# Patient Record
Sex: MALE | Race: OTHER | NOT HISPANIC OR LATINO | URBAN - METROPOLITAN AREA
[De-identification: names, ages, dates, MRNs, and addresses within clinical notes are randomized per-mention and may not be internally consistent; named-entity substitution may affect disease eponyms.]

---

## 2021-05-16 ENCOUNTER — EMERGENCY (EMERGENCY)
Facility: HOSPITAL | Age: 41
LOS: 1 days | Discharge: HOME | End: 2021-05-16
Attending: EMERGENCY MEDICINE | Admitting: EMERGENCY MEDICINE
Payer: MEDICAID

## 2021-05-16 VITALS
OXYGEN SATURATION: 97 % | DIASTOLIC BLOOD PRESSURE: 79 MMHG | TEMPERATURE: 98 F | RESPIRATION RATE: 16 BRPM | SYSTOLIC BLOOD PRESSURE: 142 MMHG | HEART RATE: 89 BPM

## 2021-05-16 LAB
ALBUMIN SERPL ELPH-MCNC: 4.5 G/DL — SIGNIFICANT CHANGE UP (ref 3.3–5)
ALP SERPL-CCNC: 56 U/L — SIGNIFICANT CHANGE UP (ref 40–120)
ALT FLD-CCNC: 17 U/L — SIGNIFICANT CHANGE UP (ref 4–41)
ANION GAP SERPL CALC-SCNC: 8 MMOL/L — SIGNIFICANT CHANGE UP (ref 7–14)
AST SERPL-CCNC: 16 U/L — SIGNIFICANT CHANGE UP (ref 4–40)
BASOPHILS # BLD AUTO: 0.02 K/UL — SIGNIFICANT CHANGE UP (ref 0–0.2)
BASOPHILS NFR BLD AUTO: 0.3 % — SIGNIFICANT CHANGE UP (ref 0–2)
BILIRUB SERPL-MCNC: 0.4 MG/DL — SIGNIFICANT CHANGE UP (ref 0.2–1.2)
BUN SERPL-MCNC: 15 MG/DL — SIGNIFICANT CHANGE UP (ref 7–23)
CALCIUM SERPL-MCNC: 9.6 MG/DL — SIGNIFICANT CHANGE UP (ref 8.4–10.5)
CHLORIDE SERPL-SCNC: 104 MMOL/L — SIGNIFICANT CHANGE UP (ref 98–107)
CO2 SERPL-SCNC: 25 MMOL/L — SIGNIFICANT CHANGE UP (ref 22–31)
CREAT SERPL-MCNC: 0.95 MG/DL — SIGNIFICANT CHANGE UP (ref 0.5–1.3)
EOSINOPHIL # BLD AUTO: 0.07 K/UL — SIGNIFICANT CHANGE UP (ref 0–0.5)
EOSINOPHIL NFR BLD AUTO: 1 % — SIGNIFICANT CHANGE UP (ref 0–6)
GLUCOSE SERPL-MCNC: 83 MG/DL — SIGNIFICANT CHANGE UP (ref 70–99)
HCT VFR BLD CALC: 41.4 % — SIGNIFICANT CHANGE UP (ref 39–50)
HGB BLD-MCNC: 13.8 G/DL — SIGNIFICANT CHANGE UP (ref 13–17)
IANC: 3.57 K/UL — SIGNIFICANT CHANGE UP (ref 1.5–8.5)
IMM GRANULOCYTES NFR BLD AUTO: 0.4 % — SIGNIFICANT CHANGE UP (ref 0–1.5)
INR BLD: 1.65 RATIO — HIGH (ref 0.88–1.16)
LYMPHOCYTES # BLD AUTO: 2.37 K/UL — SIGNIFICANT CHANGE UP (ref 1–3.3)
LYMPHOCYTES # BLD AUTO: 34.3 % — SIGNIFICANT CHANGE UP (ref 13–44)
MCHC RBC-ENTMCNC: 28.6 PG — SIGNIFICANT CHANGE UP (ref 27–34)
MCHC RBC-ENTMCNC: 33.3 GM/DL — SIGNIFICANT CHANGE UP (ref 32–36)
MCV RBC AUTO: 85.9 FL — SIGNIFICANT CHANGE UP (ref 80–100)
MONOCYTES # BLD AUTO: 0.85 K/UL — SIGNIFICANT CHANGE UP (ref 0–0.9)
MONOCYTES NFR BLD AUTO: 12.3 % — SIGNIFICANT CHANGE UP (ref 2–14)
NEUTROPHILS # BLD AUTO: 3.57 K/UL — SIGNIFICANT CHANGE UP (ref 1.8–7.4)
NEUTROPHILS NFR BLD AUTO: 51.7 % — SIGNIFICANT CHANGE UP (ref 43–77)
NRBC # BLD: 0 /100 WBCS — SIGNIFICANT CHANGE UP
NRBC # FLD: 0 K/UL — SIGNIFICANT CHANGE UP
PLATELET # BLD AUTO: 265 K/UL — SIGNIFICANT CHANGE UP (ref 150–400)
POTASSIUM SERPL-MCNC: 4.1 MMOL/L — SIGNIFICANT CHANGE UP (ref 3.5–5.3)
POTASSIUM SERPL-SCNC: 4.1 MMOL/L — SIGNIFICANT CHANGE UP (ref 3.5–5.3)
PROT SERPL-MCNC: 7.3 G/DL — SIGNIFICANT CHANGE UP (ref 6–8.3)
PROTHROM AB SERPL-ACNC: 18.5 SEC — HIGH (ref 10.6–13.6)
RBC # BLD: 4.82 M/UL — SIGNIFICANT CHANGE UP (ref 4.2–5.8)
RBC # FLD: 13.2 % — SIGNIFICANT CHANGE UP (ref 10.3–14.5)
SODIUM SERPL-SCNC: 137 MMOL/L — SIGNIFICANT CHANGE UP (ref 135–145)
WBC # BLD: 6.91 K/UL — SIGNIFICANT CHANGE UP (ref 3.8–10.5)
WBC # FLD AUTO: 6.91 K/UL — SIGNIFICANT CHANGE UP (ref 3.8–10.5)

## 2021-05-16 PROCEDURE — 93971 EXTREMITY STUDY: CPT | Mod: 26,LT

## 2021-05-16 PROCEDURE — 99285 EMERGENCY DEPT VISIT HI MDM: CPT

## 2021-05-16 NOTE — ED PROVIDER NOTE - CLINICAL SUMMARY MEDICAL DECISION MAKING FREE TEXT BOX
39 y/o male with no significant PMHx presents to the ER c/o 1 week of left leg pain/swelling.  Pt was seen at [facility] 2 days ago for the same complaints and was started on a course of Eliquis.  Pt states he had an elevated d-dimer at the hospital and states they did not have ultrasound to check his leg so they started him on Eliquis.  Pt is well appearing, NAD, will check labs, US.

## 2021-05-16 NOTE — ED PROVIDER NOTE - PHYSICAL EXAMINATION
LLE: NV intact, +TTP at medial aspect of leg with area of linear redness extending up the leg medially.  Compartments are soft. LLE: NV intact, +TTP at medial aspect of leg with area of linear redness extending up the leg medially.  Compartments are soft.    Attending/Suzan: Well-appearing, NAD; PERRL/EOMI, non-icterus, supple, no RERE, no JVD, RRR, CTAB; Abd-soft, NT/ND, no HSM; no LE +LLE swelling +cos, with overlying erythema, A&Ox3, nonfocal; Skin-warm/dry

## 2021-05-16 NOTE — ED PROVIDER NOTE - NSFOLLOWUPINSTRUCTIONS_ED_ALL_ED_FT
Follow up with your Primary Medical Doctor in 1-2 days.  Repeat Ultrasound in 2-3 days.  Stop taking Eliquis.  Warm compresses.  Elevate leg.  Return to the ER for any persistent/worsening or new symptoms chest pain, shortness of breath, swelling, increased pain or any concerning symptoms. Follow up with your Primary Medical Doctor in 1-2 days.  Repeat Ultrasound in 2-3 days.  Stop taking Eliquis.  Warm compresses.  Elevate leg.  Take Ibuprofen 600mg orally every 8 hours as needed for pain take with food.  Return to the ER for any persistent/worsening or new symptoms chest pain, shortness of breath, swelling, increased pain or any concerning symptoms.

## 2021-05-16 NOTE — ED ADULT TRIAGE NOTE - CHIEF COMPLAINT QUOTE
C/C left leg swelling, redness pain 2xweeks. Pt appears uncomfortable, difficulty ambulating. Denies trauma. Pt seen [facility] Friday Dx elevated d-dimer. No doppler available. Pt prescribed 10mg Eliquis. Denies overt bleeding, SOB, chest pain or dizziness. History HTN. Hyperlipidemia

## 2021-05-16 NOTE — ED PROVIDER NOTE - PATIENT PORTAL LINK FT
You can access the FollowMyHealth Patient Portal offered by [facility] by registering at the following website: [email or web address]. By joining [employer]’s FollowMyHealth portal, you will also be able to view your health information using other applications (apps) compatible with our system.

## 2021-05-16 NOTE — ED ADULT NURSE NOTE - CHIEF COMPLAINT QUOTE
C/C left leg swelling, redness pain 2xweeks. Pt appears uncomfortable, difficulty ambulating. Denies trauma. Pt seen [facility] Friday Dx elevated d-dimer. No doppler available. Pt prescribed 10mg Eliquis. Denies overt bleeding, SOB, chest pain or dizziness. History HTN. Hyperlipidemia

## 2021-05-16 NOTE — ED ADULT NURSE NOTE - OBJECTIVE STATEMENT
Patient alert and awake, oriented x4, breathing with ease on room, skin intact, ambulates with steady gait. Patient with redness and swelling noted to left lower leg x 1 week, complains of difficulty walking. Patient denies fevers, chills, chest pain, or trouble breathing.

## 2021-05-16 NOTE — ED PROVIDER NOTE - PROGRESS NOTE DETAILS
RICKIE Ca: US negative for DVT, us showing superficial thrombophlebitis.  Will advise to stop Eliquis, warm compresses, pain control, follow up PMD.  Pt given strict return precautions.

## 2021-05-16 NOTE — ED PROVIDER NOTE - OBJECTIVE STATEMENT
39 y/o male with no significant PMHx presents to the ER c/o 1 week of left leg pain/swelling.  Pt was seen at [facility] 2 days ago for the same complaints and was started on a course of Eliquis.  Pt states he had an elevated d-dimer at the hospital and states they did not have ultrasound to check his leg so they started him on Eliquis.  Pt reports continued pain and swelling.  Pt denies chest pain, shortness of breath fevers, chills, trauma, recent travel, hx of clots. 41 y/o male with no significant PMHx presents to the ER c/o 1 week of left leg pain/swelling.  Pt was seen at [facility] 2 days ago for the same complaints and was started on a course of Eliquis.  Pt states he had an elevated d-dimer at the hospital and states they did not have ultrasound to check his leg so they started him on Eliquis.  Pt reports continued pain and swelling.  Pt denies chest pain, shortness of breath fevers, chills, trauma, recent travel, hx of clots.    Attending/Suzan: 39 yo m as described above, p/w ~one week of LLE swelling. Seen and eval at [facility] ~2 days ago, told he an elevated d-dimer and started on Eliquis. reportedly [facility] not able to perform an imaging study. Pt deneis CP, SOB, palp, weakness or lightheadedness. Furthr deneis fever/chills.

## 2023-02-06 PROBLEM — Z78.9 OTHER SPECIFIED HEALTH STATUS: Chronic | Status: ACTIVE | Noted: 2021-05-16

## 2023-04-05 PROBLEM — Z00.00 ENCOUNTER FOR PREVENTIVE HEALTH EXAMINATION: Status: ACTIVE | Noted: 2023-04-05

## 2023-04-06 ENCOUNTER — APPOINTMENT (OUTPATIENT)
Dept: SURGERY | Facility: CLINIC | Age: 43
End: 2023-04-06
Payer: COMMERCIAL

## 2023-04-06 VITALS — WEIGHT: 278 LBS | BODY MASS INDEX: 39.8 KG/M2 | HEIGHT: 70 IN

## 2023-04-06 DIAGNOSIS — Z78.9 OTHER SPECIFIED HEALTH STATUS: ICD-10-CM

## 2023-04-06 DIAGNOSIS — Z86.39 PERSONAL HISTORY OF OTHER ENDOCRINE, NUTRITIONAL AND METABOLIC DISEASE: ICD-10-CM

## 2023-04-06 DIAGNOSIS — Z86.718 PERSONAL HISTORY OF OTHER VENOUS THROMBOSIS AND EMBOLISM: ICD-10-CM

## 2023-04-06 PROCEDURE — 99203 OFFICE O/P NEW LOW 30 MIN: CPT

## 2023-04-11 NOTE — ASSESSMENT
[FreeTextEntry1] : Patient with recently noted multinodular goiter with normal thyroid functions.  No nodules requiring biopsy at this time.  I have recommended a repeat ultrasound September 2023.  If nodules enlarge or develop suspicious characteristics, a biopsy will be performed at that time.  RTO 6 months.  I have answered their questions to the best of my ability.\par

## 2023-04-11 NOTE — CONSULT LETTER
[Consult Letter:] : I had the pleasure of evaluating your patient, [unfilled]. [Please see my note below.] : Please see my note below. [Consult Closing:] : Thank you very much for allowing me to participate in the care of this patient.  If you have any questions, please do not hesitate to contact me. [Sincerely,] : Sincerely, [Dear  ___] : Dear ~KEN, [FreeTextEntry2] : Nurse practitioner Danielle Vilsaint [FreeTextEntry3] : Carolyn Lam MD, FACS\par Assistant Professor of Surgery and Otolaryngology\par [facility]\par

## 2023-04-11 NOTE — PHYSICAL EXAM
[Normal] : no neck adenopathy [de-identified] : Short thick neck,  no cervical or supraclavicular adenopathy, trachea midline, thyroid without enlargement or palpable mass [de-identified] : Skin:  normal appearance.  no rash, nodules, vesicles, or erythema,\par Musculoskeletal:  full range of motion and no deformities appreciated\par Neurological:  grossly intact\par Psychiatric:  oriented to person, place and time with appropriate affect

## 2023-04-11 NOTE — REASON FOR VISIT
[Initial Consultation] : an initial consultation for [Other: _____] : [unfilled] [FreeTextEntry2] : Multinodular goiter

## 2023-04-11 NOTE — HISTORY OF PRESENT ILLNESS
[de-identified] : Patient referred by nurse practitioner Vilsaint for evaluation of multinodular goiter.  During recent physical examination, left thyroid was felt to be enlarged.  Thyroid ultrasound December 2022: Right lobe 5.3 x 2.4 x 2.1 cm with mid 11 x 11 x 13 mm and mid 11 x 13 x 12 mm nodules.  Left lobe 5.1 x 1.7 x 1.8 cm with mid 16 x 15 x 8 mm nodule and mid 13 x 11 x 9 mm nodule TR 3.  No enlarged lymph nodes.  TSH 2, free T4 1.2, antibodies negative.  Patient developed left lower extremity DVT while sedentary at home during COVID.  Has not had hematologic work-up.  Denies recurrence or shortness of breath.  Patient denies prior history of thyroid disease, dysphagia, change in voice or radiation exposure.  I have reviewed all old and new data and available images.

## 2023-10-03 ENCOUNTER — OUTPATIENT (OUTPATIENT)
Dept: OUTPATIENT SERVICES | Facility: HOSPITAL | Age: 43
LOS: 1 days | End: 2023-10-03
Payer: COMMERCIAL

## 2023-10-03 ENCOUNTER — APPOINTMENT (OUTPATIENT)
Dept: ULTRASOUND IMAGING | Facility: CLINIC | Age: 43
End: 2023-10-03
Payer: COMMERCIAL

## 2023-10-03 DIAGNOSIS — E04.9 NONTOXIC GOITER, UNSPECIFIED: ICD-10-CM

## 2023-10-03 DIAGNOSIS — E04.2 NONTOXIC MULTINODULAR GOITER: ICD-10-CM

## 2023-10-03 PROCEDURE — 76536 US EXAM OF HEAD AND NECK: CPT | Mod: 26

## 2023-10-03 PROCEDURE — 76536 US EXAM OF HEAD AND NECK: CPT

## 2023-10-05 ENCOUNTER — APPOINTMENT (OUTPATIENT)
Dept: SURGERY | Facility: CLINIC | Age: 43
End: 2023-10-05
Payer: COMMERCIAL

## 2023-10-05 DIAGNOSIS — E04.2 NONTOXIC MULTINODULAR GOITER: ICD-10-CM

## 2023-10-05 DIAGNOSIS — E04.9 NONTOXIC GOITER, UNSPECIFIED: ICD-10-CM

## 2023-10-05 PROCEDURE — 99213 OFFICE O/P EST LOW 20 MIN: CPT

## 2023-10-19 ENCOUNTER — TRANSCRIPTION ENCOUNTER (OUTPATIENT)
Age: 43
End: 2023-10-19

## 2024-05-07 ENCOUNTER — APPOINTMENT (OUTPATIENT)
Dept: SURGERY | Facility: CLINIC | Age: 44
End: 2024-05-07

## 2024-07-17 ENCOUNTER — APPOINTMENT (OUTPATIENT)
Dept: ULTRASOUND IMAGING | Facility: CLINIC | Age: 44
End: 2024-07-17

## 2024-07-17 ENCOUNTER — APPOINTMENT (OUTPATIENT)
Dept: MRI IMAGING | Facility: CLINIC | Age: 44
End: 2024-07-17

## 2024-07-17 PROCEDURE — 76536 US EXAM OF HEAD AND NECK: CPT

## 2024-07-17 PROCEDURE — 93971 EXTREMITY STUDY: CPT | Mod: LT

## 2025-05-19 DIAGNOSIS — E04.2 NONTOXIC MULTINODULAR GOITER: ICD-10-CM

## 2025-05-24 ENCOUNTER — OUTPATIENT (OUTPATIENT)
Dept: OUTPATIENT SERVICES | Facility: HOSPITAL | Age: 45
LOS: 1 days | End: 2025-05-24
Payer: COMMERCIAL

## 2025-05-24 ENCOUNTER — APPOINTMENT (OUTPATIENT)
Dept: ULTRASOUND IMAGING | Facility: CLINIC | Age: 45
End: 2025-05-24

## 2025-05-24 DIAGNOSIS — E04.2 NONTOXIC MULTINODULAR GOITER: ICD-10-CM

## 2025-05-24 PROCEDURE — 76536 US EXAM OF HEAD AND NECK: CPT | Mod: 26

## 2025-05-24 PROCEDURE — 76536 US EXAM OF HEAD AND NECK: CPT

## 2025-07-15 ENCOUNTER — APPOINTMENT (OUTPATIENT)
Dept: SURGERY | Facility: CLINIC | Age: 45
End: 2025-07-15